# Patient Record
Sex: MALE | Race: WHITE | Employment: FULL TIME | URBAN - NONMETROPOLITAN AREA
[De-identification: names, ages, dates, MRNs, and addresses within clinical notes are randomized per-mention and may not be internally consistent; named-entity substitution may affect disease eponyms.]

---

## 2017-12-30 ENCOUNTER — HOSPITAL ENCOUNTER (EMERGENCY)
Age: 52
Discharge: HOME | End: 2017-12-30
Payer: COMMERCIAL

## 2017-12-30 VITALS
BODY MASS INDEX: 26.68 KG/M2 | DIASTOLIC BLOOD PRESSURE: 86 MMHG | TEMPERATURE: 97.6 F | SYSTOLIC BLOOD PRESSURE: 133 MMHG | WEIGHT: 170 LBS | OXYGEN SATURATION: 99 % | HEART RATE: 70 BPM | RESPIRATION RATE: 16 BRPM | HEIGHT: 67 IN

## 2017-12-30 DIAGNOSIS — J06.9 ACUTE UPPER RESPIRATORY INFECTION: Primary | ICD-10-CM

## 2017-12-30 PROCEDURE — 99212 OFFICE O/P EST SF 10 MIN: CPT

## 2017-12-30 PROCEDURE — 99202 OFFICE O/P NEW SF 15 MIN: CPT | Performed by: NURSE PRACTITIONER

## 2017-12-30 RX ORDER — CYCLOSPORINE 0.5 MG/ML
1 EMULSION OPHTHALMIC 2 TIMES DAILY
COMMUNITY

## 2017-12-30 RX ORDER — L. ACIDOPHILUS/BIFID. ANIMALIS 2B CELL
CAPSULE ORAL
COMMUNITY

## 2017-12-30 RX ORDER — AMOXICILLIN AND CLAVULANATE POTASSIUM 500; 125 MG/1; MG/1
1 TABLET, FILM COATED ORAL 2 TIMES DAILY
Qty: 20 TABLET | Refills: 0 | Status: SHIPPED | OUTPATIENT
Start: 2017-12-30 | End: 2018-01-09

## 2017-12-30 RX ORDER — PREDNISONE 20 MG/1
20 TABLET ORAL 2 TIMES DAILY
Qty: 8 TABLET | Refills: 0 | Status: SHIPPED | OUTPATIENT
Start: 2017-12-30 | End: 2018-01-03

## 2017-12-30 RX ORDER — NICOTINE POLACRILEX 4 MG/1
20 GUM, CHEWING ORAL DAILY
COMMUNITY

## 2017-12-30 ASSESSMENT — ENCOUNTER SYMPTOMS
STRIDOR: 0
NAUSEA: 0
RHINORRHEA: 0
SORE THROAT: 1
WHEEZING: 0
VOICE CHANGE: 1
EYE DISCHARGE: 0
ABDOMINAL PAIN: 0
VOMITING: 0
DIARRHEA: 0
BACK PAIN: 0
SHORTNESS OF BREATH: 0
TROUBLE SWALLOWING: 1
COLOR CHANGE: 0
CONSTIPATION: 0
COUGH: 1
EYE PAIN: 0

## 2017-12-30 ASSESSMENT — PAIN SCALES - GENERAL: PAINLEVEL_OUTOF10: 6

## 2017-12-30 ASSESSMENT — PAIN DESCRIPTION - LOCATION: LOCATION: NECK

## 2017-12-30 NOTE — ED PROVIDER NOTES
[id number]  Urgent Care Encounter      CHIEF COMPLAINT       Chief Complaint   Patient presents with    Cough     sore throat, dry cough, neck stiff       Nurses Notes reviewed and I agree except as noted in the HPI. HISTORY OF PRESENT ILLNESS   Lupe Sarmiento is a 45 y.o. male who presents With 4 days of dry cough, somewhat stiff neck and lower back, sore throat, difficulty swallowing and voice change.,  Headaches. Patient denies any vomiting or diarrhea, abdominal pain, wheezing or stridor. Patient is a nonsmoker. He had a right hand trigger finger problem repaired in surgery yesterday. REVIEW OF SYSTEMS     Review of Systems   Constitutional: Negative for activity change, appetite change, chills, fatigue and fever. HENT: Positive for sore throat, trouble swallowing and voice change. Negative for congestion, ear pain and rhinorrhea. Eyes: Negative for pain and discharge. Respiratory: Positive for cough. Negative for shortness of breath, wheezing and stridor. Cardiovascular: Negative for chest pain. Gastrointestinal: Negative for abdominal pain, constipation, diarrhea, nausea and vomiting. Genitourinary: Negative for dysuria, flank pain and frequency. Musculoskeletal: Positive for myalgias and neck pain. Negative for arthralgias and back pain. Skin: Negative for color change and rash. Allergic/Immunologic: Negative for immunocompromised state. Neurological: Positive for headaches. Negative for dizziness and weakness. Psychiatric/Behavioral: Negative. PAST MEDICAL HISTORY         Diagnosis Date    Alopecia areata universalis        SURGICAL HISTORY     Patient  has a past surgical history that includes Finger trigger release (12/29/2017).     CURRENT MEDICATIONS       Discharge Medication List as of 12/30/2017  9:01 AM      CONTINUE these medications which have NOT CHANGED    Details   cycloSPORINE (RESTASIS) 0.05 % ophthalmic emulsion 1 drop 2 times dailyHistorical Med      omeprazole 20 MG EC tablet Take 20 mg by mouth dailyHistorical Med      Probiotic Product (TRUBIOTICS) CAPS Take by mouthHistorical Med      meloxicam (MOBIC) 15 MG tablet Take 15 mg by mouth daily. Historical Med      fluticasone (FLONASE) 50 MCG/ACT nasal spray 2 sprays by Nasal route daily. Historical Med             ALLERGIES     Patient is has No Known Allergies. FAMILY HISTORY     Patient's family history is not on file. SOCIAL HISTORY     Patient  reports that he has never smoked. He has never used smokeless tobacco. He reports that he drinks about 3.6 oz of alcohol per week . He reports that he does not use drugs. PHYSICAL EXAM     ED TRIAGE VITALS  BP: 133/86, Temp: 97.6 °F (36.4 °C), Pulse: 70, Resp: 16, SpO2: 99 %  Physical Exam   Constitutional: Vital signs are normal. He appears well-developed and well-nourished. He is cooperative. He appears ill. HENT:   Right Ear: Hearing, tympanic membrane, external ear and ear canal normal.   Left Ear: Hearing, tympanic membrane, external ear and ear canal normal.   Nose: Nose normal. No mucosal edema or rhinorrhea. Right sinus exhibits no maxillary sinus tenderness. Left sinus exhibits no maxillary sinus tenderness. Mouth/Throat: Uvula is midline and mucous membranes are normal. Posterior oropharyngeal edema and posterior oropharyngeal erythema present. No oropharyngeal exudate. Eyes: Conjunctivae and EOM are normal. Pupils are equal, round, and reactive to light. Neck: Normal range of motion and full passive range of motion without pain. Neck supple. Cardiovascular: Regular rhythm. Pulmonary/Chest: Effort normal and breath sounds normal. He has no decreased breath sounds. He has no wheezes. He has no rhonchi. He has no rales. Lymphadenopathy:     He has cervical adenopathy. Right cervical: Deep cervical adenopathy present. Left cervical: Deep cervical adenopathy present. Neurological: He is alert.  He displays no tremor. Coordination normal. GCS eye subscore is 4. GCS verbal subscore is 5. GCS motor subscore is 6. Skin: Skin is warm and dry. He is not diaphoretic. Psychiatric: He has a normal mood and affect. His speech is normal and behavior is normal. Judgment and thought content normal. Cognition and memory are normal.       DIAGNOSTIC RESULTS   Labs:No results found for this visit on 12/30/17. IMAGING:    URGENT CARE COURSE:     Vitals:    12/30/17 0843   BP: 133/86   Pulse: 70   Resp: 16   Temp: 97.6 °F (36.4 °C)   TempSrc: Temporal   SpO2: 99%   Weight: 170 lb (77.1 kg)   Height: 5' 7\" (1.702 m)     Patient appears ill nontoxic, has normal vital signs. Posterior oropharynx is erythematous with no exudate. Positive bilateral cervical adenopathy. TMs are normal, no nasal congestion noted. She coughs with provocation of deep breathe. His neck pain is mostly centered in the left paracervical spinal muscle and radiating down into his left shoulder somewhat like a torticollis. Due to patient's postoperative status, he is treated with an antibiotic and prednisone for symptomatic relief of his horse sore throat. Medications - No data to display  PROCEDURES:  None  FINAL IMPRESSION      1.  Acute upper respiratory infection        DISPOSITION/PLAN   DISPOSITION Decision To Discharge 12/30/2017 08:59:50 AM    PATIENT REFERRED TO:  Kory Laureano, [address] [facility]  [phone]    In 3 days  If symptoms worsen    DISCHARGE MEDICATIONS:  Discharge Medication List as of 12/30/2017  9:01 AM      START taking these medications    Details   amoxicillin-clavulanate (AUGMENTIN) 500-125 MG per tablet Take 1 tablet by mouth 2 times daily for 10 days, Disp-20 tablet, R-0Normal      predniSONE (DELTASONE) 20 MG tablet Take 1 tablet by mouth 2 times daily for 4 days, Disp-8 tablet, R-0Normal           Discharge Medication List as of 12/30/2017  9:01 AM          Maggie Landa NP

## 2017-12-30 NOTE — ED NOTES
Pt. Released in stable condition, ambulated per self to private car. Instructed pt to follow-up with family doctor as needed for recheck or go directly to the emergency department for any concerns/worsening conditions. Pt. Verbalized understanding of instructions. No questions at this time. RX in hand.       Reyna Esteban RN  12/30/17 [id number]

## 2021-05-05 ENCOUNTER — HOSPITAL ENCOUNTER (OUTPATIENT)
Age: 56
Discharge: HOME | End: 2021-05-05
Payer: COMMERCIAL

## 2021-05-05 LAB
ANION GAP SERPL CALCULATED.3IONS-SCNC: 9 MEQ/L (ref 8–16)
BASOPHILS # BLD: 1.3 %
BASOPHILS ABSOLUTE: 0.1 THOU/MM3 (ref 0–0.1)
BUN BLDV-MCNC: 14 MG/DL (ref 7–22)
CALCIUM SERPL-MCNC: 9.3 MG/DL (ref 8.5–10.5)
CHLORIDE BLD-SCNC: 104 MEQ/L (ref 98–111)
CO2: 27 MEQ/L (ref 23–33)
CREAT SERPL-MCNC: 1.1 MG/DL (ref 0.4–1.2)
EOSINOPHIL # BLD: 3.1 %
EOSINOPHILS ABSOLUTE: 0.3 THOU/MM3 (ref 0–0.4)
ERYTHROCYTE [DISTWIDTH] IN BLOOD BY AUTOMATED COUNT: 12.6 % (ref 11.5–14.5)
ERYTHROCYTE [DISTWIDTH] IN BLOOD BY AUTOMATED COUNT: 43.7 FL (ref 35–45)
GFR SERPL CREATININE-BSD FRML MDRD: 69 ML/MIN/1.73M2
GLUCOSE BLD-MCNC: 93 MG/DL (ref 70–108)
HCT VFR BLD CALC: 43.5 % (ref 42–52)
HEMOGLOBIN: 14.5 GM/DL (ref 14–18)
IMMATURE GRANS (ABS): 0.07 THOU/MM3 (ref 0–0.07)
IMMATURE GRANULOCYTES: 0.7 %
LYMPHOCYTES # BLD: 20.6 %
LYMPHOCYTES ABSOLUTE: 2.1 THOU/MM3 (ref 1–4.8)
MCH RBC QN AUTO: 31.5 PG (ref 26–33)
MCHC RBC AUTO-ENTMCNC: 33.3 GM/DL (ref 32.2–35.5)
MCV RBC AUTO: 94.6 FL (ref 80–94)
MONOCYTES # BLD: 7.7 %
MONOCYTES ABSOLUTE: 0.8 THOU/MM3 (ref 0.4–1.3)
NUCLEATED RED BLOOD CELLS: 0 /100 WBC
PLATELET # BLD: 215 THOU/MM3 (ref 130–400)
PMV BLD AUTO: 10.1 FL (ref 9.4–12.4)
POTASSIUM SERPL-SCNC: 4.3 MEQ/L (ref 3.5–5.2)
RBC # BLD: 4.6 MILL/MM3 (ref 4.7–6.1)
SEG NEUTROPHILS: 66.6 %
SEGMENTED NEUTROPHILS ABSOLUTE COUNT: 6.9 THOU/MM3 (ref 1.8–7.7)
SODIUM BLD-SCNC: 140 MEQ/L (ref 135–145)
WBC # BLD: 10.4 THOU/MM3 (ref 4.8–10.8)

## 2021-05-05 PROCEDURE — 36415 COLL VENOUS BLD VENIPUNCTURE: CPT

## 2021-05-05 PROCEDURE — 85025 COMPLETE CBC W/AUTO DIFF WBC: CPT

## 2021-05-05 PROCEDURE — 80048 BASIC METABOLIC PNL TOTAL CA: CPT

## 2021-05-06 ENCOUNTER — HOSPITAL ENCOUNTER (OUTPATIENT)
Age: 56
Discharge: HOME | End: 2021-05-06
Payer: COMMERCIAL

## 2021-05-06 LAB
ADENOVIRUS F 40 41 PCR: NOT DETECTED
ASTROVIRUS PCR: NOT DETECTED
CAMPYLOBACTER PCR: NOT DETECTED
CLOSTRIDIUM DIFFICILE, PCR: NOT DETECTED
CRYPTOSPORIDIUM PCR: NOT DETECTED
CYCLOSPORA CAYETANENSIS PCR: NOT DETECTED
E COLI 0157 PCR: NORMAL
E COLI ENTEROAGGREGATIVE PCR: NOT DETECTED
E COLI ENTEROPATHOGENIC PCR: NOT DETECTED
E COLI ENTEROTOXIGENIC PCR: NOT DETECTED
E COLI SHIGA LIKE TOXIN PCR: NOT DETECTED
E COLI SHIGELLA/ENTEROINVASIVE PCR: NOT DETECTED
E HISTOLYTICA GI FILM ARRAY: NOT DETECTED
GIARDIA LAMBLIA PCR: NOT DETECTED
NOROVIRUS GI GII PCR: NOT DETECTED
PLESIOMONAS SHIGELLOIDES PCR: NOT DETECTED
ROTAVIRUS A PCR: NOT DETECTED
SALMONELLA PCR: NOT DETECTED
SAPOVIRUS PCR: NOT DETECTED
VIBRIO CHOLERAE PCR: NOT DETECTED
VIBRIO PCR: NOT DETECTED
YERSINIA ENTEROCOLITICA PCR: NOT DETECTED

## 2021-05-06 PROCEDURE — 87427 SHIGA-LIKE TOXIN AG IA: CPT

## 2021-05-06 PROCEDURE — 87177 OVA AND PARASITES SMEARS: CPT

## 2021-05-06 PROCEDURE — 87045 FECES CULTURE AEROBIC BACT: CPT

## 2021-05-06 PROCEDURE — 0097U HC GI PTHGN MULT REV TRANS & AMP PRB TECH 22 TRGT: CPT

## 2021-05-07 LAB — OVA AND PARASITES: NORMAL

## 2021-05-08 LAB — CULTURE, STOOL: NORMAL

## 2021-10-11 ENCOUNTER — HOSPITAL ENCOUNTER (OUTPATIENT)
Age: 56
Discharge: HOME | End: 2021-10-11
Payer: COMMERCIAL

## 2021-10-11 PROCEDURE — U0005 INFEC AGEN DETEC AMPLI PROBE: HCPCS

## 2021-10-11 PROCEDURE — U0003 INFECTIOUS AGENT DETECTION BY NUCLEIC ACID (DNA OR RNA); SEVERE ACUTE RESPIRATORY SYNDROME CORONAVIRUS 2 (SARS-COV-2) (CORONAVIRUS DISEASE [COVID-19]), AMPLIFIED PROBE TECHNIQUE, MAKING USE OF HIGH THROUGHPUT TECHNOLOGIES AS DESCRIBED BY CMS-2020-01-R: HCPCS

## 2021-10-12 LAB
SARS-COV-2: NOT DETECTED
SOURCE: NORMAL

## 2024-11-18 ENCOUNTER — HOSPITAL ENCOUNTER (OUTPATIENT)
Age: 59
Discharge: HOME | End: 2024-11-18
Payer: COMMERCIAL

## 2024-11-18 ENCOUNTER — HOSPITAL ENCOUNTER (OUTPATIENT)
Dept: GENERAL RADIOLOGY | Age: 59
Discharge: HOME | End: 2024-11-18
Payer: COMMERCIAL

## 2024-11-18 DIAGNOSIS — R50.9 FEVER, UNKNOWN ORIGIN: ICD-10-CM

## 2024-11-18 DIAGNOSIS — R05.9 COMPLAINING OF COUGH: ICD-10-CM

## 2024-11-18 PROCEDURE — 71046 X-RAY EXAM CHEST 2 VIEWS: CPT

## 2024-12-27 ENCOUNTER — HOSPITAL ENCOUNTER (OUTPATIENT)
Dept: GENERAL RADIOLOGY | Age: 59
Discharge: HOME | End: 2024-12-27
Payer: COMMERCIAL

## 2024-12-27 ENCOUNTER — HOSPITAL ENCOUNTER (OUTPATIENT)
Age: 59
Discharge: HOME | End: 2024-12-27
Payer: COMMERCIAL

## 2024-12-27 DIAGNOSIS — R05.9 COUGH IN ADULT: ICD-10-CM

## 2024-12-27 PROCEDURE — 71046 X-RAY EXAM CHEST 2 VIEWS: CPT

## 2025-05-20 ENCOUNTER — HOSPITAL ENCOUNTER (OUTPATIENT)
Dept: GENERAL RADIOLOGY | Age: 60
Discharge: HOME | End: 2025-05-20

## 2025-05-20 ENCOUNTER — OFFICE VISIT (OUTPATIENT)
Age: 60
End: 2025-05-20
Payer: COMMERCIAL

## 2025-05-20 ENCOUNTER — HOSPITAL ENCOUNTER (OUTPATIENT)
Dept: MRI IMAGING | Age: 60
Discharge: HOME | End: 2025-05-20
Attending: RADIOLOGY

## 2025-05-20 VITALS
OXYGEN SATURATION: 96 % | HEIGHT: 67 IN | SYSTOLIC BLOOD PRESSURE: 114 MMHG | DIASTOLIC BLOOD PRESSURE: 60 MMHG | WEIGHT: 173.4 LBS | HEART RATE: 65 BPM | BODY MASS INDEX: 27.21 KG/M2

## 2025-05-20 DIAGNOSIS — Z00.6 ENCOUNTER FOR EXAMINATION FOR NORMAL COMPARISON OR CONTROL IN CLINICAL RESEARCH PROGRAM: ICD-10-CM

## 2025-05-20 DIAGNOSIS — M35.01 SJOGREN'S SYNDROME WITH KERATOCONJUNCTIVITIS SICCA: ICD-10-CM

## 2025-05-20 DIAGNOSIS — M51.360 DEGENERATION OF INTERVERTEBRAL DISC OF LUMBAR REGION WITH DISCOGENIC BACK PAIN: Primary | ICD-10-CM

## 2025-05-20 PROCEDURE — 99204 OFFICE O/P NEW MOD 45 MIN: CPT | Performed by: INTERNAL MEDICINE

## 2025-05-20 RX ORDER — MELOXICAM 15 MG/1
15 TABLET ORAL DAILY PRN
Qty: 30 TABLET | Refills: 5 | Status: SHIPPED | OUTPATIENT
Start: 2025-05-20

## 2025-05-20 RX ORDER — DESMOPRESSIN ACETATE 0.2 MG/1
TABLET ORAL
COMMUNITY
Start: 2025-04-22

## 2025-05-20 RX ORDER — CYCLOSPORINE 0.5 MG/ML
1 EMULSION OPHTHALMIC 2 TIMES DAILY
COMMUNITY

## 2025-05-20 RX ORDER — CELECOXIB 200 MG/1
CAPSULE ORAL
COMMUNITY
End: 2025-05-20

## 2025-05-20 RX ORDER — TAMSULOSIN HYDROCHLORIDE 0.4 MG/1
0.4 CAPSULE ORAL DAILY
COMMUNITY

## 2025-05-20 ASSESSMENT — ENCOUNTER SYMPTOMS
COUGH: 0
VOMITING: 0
NAUSEA: 0
ABDOMINAL PAIN: 0
SHORTNESS OF BREATH: 0
BLOOD IN STOOL: 0

## 2025-05-20 NOTE — PROGRESS NOTES
[facility]   Rheumatology Clinic Note      5/20/2025       Reason for Consult:  osteoarthritis and   Requesting Physician:  Lucian Malik MD     CHIEF COMPLAINT:    Chief Complaint   Patient presents with    New Patient     Osteoarthritis    Other     Patient has no concern at thi time.  Establish care         HISTORY OF PRESENT ILLNESS:    60 y.o. male presents today to establish care. Has osteoarthritis and Sjogren's syndrome. Former pt of Dr. Bryanna Solomon ([facility]), desires to transfer care due to long commute.    Reports having mostly dry eyes that is stable with use of Restasis. No significant dry mouth.  Has DDD cervical spine and lumbar spine.    Pain is mostly in his neck. This is chronic for over 30 years. He recently saw pain management and is considering facet joint injections.   H/o trigger finger in the right hand. Had surgery on index. Now it is in the 4th digit.   Has prolonged morning stiffness that lasts for the day.   No other joint pain. No joint swelling.   He is on Celebrex 200 mg daily. This was switched from meloxicam few years ago. He believes that his pain was better controlled when taking meloxicam. No stomach upset with meloxicam that he is aware of.     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  H/o left hip replacement in 2023.   No skin rashes. No oral ulcers.   No SOB.     Aunt has RA.  A brother and sister have Crohn's disease.  A brother has ulcerative colitis.       Past Medical History:     has a past medical history of Alopecia areata universalis, DDD (degenerative disc disease), cervical, Osteoarthritis, and Sjogren's disease.    Past Surgical History:     has a past surgical history that includes Finger trigger release (12/29/2017) and Total hip arthroplasty (Left).    Current Medications:      Current Outpatient Medications:     DESMOpressin (DDAVP) 0.2 MG tablet, TAKE 1 TABLET BY MOUTH 30 MINUTES BEFORE BEDTIME, Disp: , Rfl:     tamsulosin (FLOMAX)

## 2025-06-05 LAB
ALBUMIN: 4.3 G/DL (ref 3.5–5.2)
ALK PHOSPHATASE: 69 U/L (ref 39–118)
ALT SERPL-CCNC: 22 U/L (ref 5–41)
ANION GAP SERPL CALCULATED.3IONS-SCNC: 15 MMOL/L (ref 7–16)
AST SERPL-CCNC: 10 U/L (ref 9–50)
BILIRUB SERPL-MCNC: 0.5 MG/DL
BUN BLDV-MCNC: 17 MG/DL (ref 8–23)
C-REACTIVE PROTEIN: 0.1 MG/DL
CALCIUM SERPL-MCNC: 9.3 MG/DL (ref 8.6–10.5)
CCP IGG ANTIBODIES: <0.5 U/ML
CHLORIDE BLD-SCNC: 104 MMOL/L (ref 96–107)
CO2: 21 MMOL/L (ref 18–32)
CREAT SERPL-MCNC: 1 MG/DL (ref 0.67–1.3)
DSDNA ANTIBODY: <1 IU/ML
EGFR IF NONAFRICAN AMERICAN: 85 ML/MIN/1.73M2
ENA TO SMITH (SM) ANTIBODY IGG: <0.2 AI
GLUCOSE: 101 MG/DL (ref 70–100)
POTASSIUM SERPL-SCNC: 4 MMOL/L (ref 3.5–5.4)
RHEUMATOID FACTOR: 13 IU/ML
RNP AB, IGG: <0.2 AI
SED RATE, AUTOMATED: 6 MM/HR (ref 0–19)
SJOGREN'S ANTIBODIES (SSB): 1 AI
SODIUM BLD-SCNC: 140 MMOL/L (ref 135–148)
TOTAL PROTEIN: 6.6 G/DL (ref 6–8.3)

## 2025-06-06 LAB
HLA B27: NEGATIVE
Lab: NORMAL

## 2025-06-07 LAB
ANTI-NUCLEAR ANTIBODY (ANA): NEGATIVE
ENA SS-A IGG SERUM: 3 AU/ML (ref 0–40)
Lab: 0 AU/ML (ref 0–40)

## 2025-09-03 ENCOUNTER — OFFICE VISIT (OUTPATIENT)
Age: 60
End: 2025-09-03
Payer: COMMERCIAL

## 2025-09-03 VITALS
DIASTOLIC BLOOD PRESSURE: 72 MMHG | OXYGEN SATURATION: 94 % | SYSTOLIC BLOOD PRESSURE: 120 MMHG | HEIGHT: 67 IN | BODY MASS INDEX: 26.84 KG/M2 | WEIGHT: 171 LBS | HEART RATE: 67 BPM

## 2025-09-03 DIAGNOSIS — M51.360 DEGENERATION OF INTERVERTEBRAL DISC OF LUMBAR REGION WITH DISCOGENIC BACK PAIN: ICD-10-CM

## 2025-09-03 DIAGNOSIS — Z79.1 ENCOUNTER FOR MONITORING CHRONIC NSAID THERAPY: ICD-10-CM

## 2025-09-03 DIAGNOSIS — Z51.81 ENCOUNTER FOR MONITORING CHRONIC NSAID THERAPY: ICD-10-CM

## 2025-09-03 DIAGNOSIS — M35.01 SJOGREN'S SYNDROME WITH KERATOCONJUNCTIVITIS SICCA: Primary | ICD-10-CM

## 2025-09-03 PROCEDURE — 99214 OFFICE O/P EST MOD 30 MIN: CPT | Performed by: INTERNAL MEDICINE

## 2025-09-03 ASSESSMENT — ENCOUNTER SYMPTOMS
COUGH: 0
ABDOMINAL PAIN: 0
VOMITING: 0
SHORTNESS OF BREATH: 0
NAUSEA: 0

## 2025-09-03 ASSESSMENT — LIFESTYLE VARIABLES
HOW OFTEN DO YOU HAVE A DRINK CONTAINING ALCOHOL: MONTHLY OR LESS
HOW MANY STANDARD DRINKS CONTAINING ALCOHOL DO YOU HAVE ON A TYPICAL DAY: 5 OR 6